# Patient Record
Sex: MALE | Race: BLACK OR AFRICAN AMERICAN | NOT HISPANIC OR LATINO
[De-identification: names, ages, dates, MRNs, and addresses within clinical notes are randomized per-mention and may not be internally consistent; named-entity substitution may affect disease eponyms.]

---

## 2017-10-22 PROBLEM — Z00.00 ENCOUNTER FOR PREVENTIVE HEALTH EXAMINATION: Status: ACTIVE | Noted: 2017-10-22

## 2017-11-21 ENCOUNTER — APPOINTMENT (OUTPATIENT)
Dept: GASTROENTEROLOGY | Facility: CLINIC | Age: 62
End: 2017-11-21

## 2018-01-12 ENCOUNTER — APPOINTMENT (OUTPATIENT)
Dept: GASTROENTEROLOGY | Facility: CLINIC | Age: 63
End: 2018-01-12

## 2018-03-09 ENCOUNTER — APPOINTMENT (OUTPATIENT)
Dept: GASTROENTEROLOGY | Facility: CLINIC | Age: 63
End: 2018-03-09

## 2020-05-18 ENCOUNTER — APPOINTMENT (OUTPATIENT)
Dept: PULMONOLOGY | Facility: CLINIC | Age: 65
End: 2020-05-18

## 2022-02-01 ENCOUNTER — APPOINTMENT (OUTPATIENT)
Dept: GASTROENTEROLOGY | Facility: CLINIC | Age: 67
End: 2022-02-01

## 2022-06-23 ENCOUNTER — APPOINTMENT (OUTPATIENT)
Dept: GASTROENTEROLOGY | Facility: CLINIC | Age: 67
End: 2022-06-23

## 2022-06-23 NOTE — HISTORY OF PRESENT ILLNESS
[de-identified] : 67-year-old gentleman here for colonoscopy.\par \par The patient is here for a colonoscopy. \par Details re: last colonoscopy:\par \par Family history:\par BM/D\par Blood/mucus\par Change in bowel habits\par Weight changes:\par \par Dysphagia:\par Odynophagia:\par GERD sx:\par Smoking:\par NSAID use:\par \par \par

## 2022-09-01 ENCOUNTER — APPOINTMENT (OUTPATIENT)
Dept: GASTROENTEROLOGY | Facility: CLINIC | Age: 67
End: 2022-09-01

## 2022-09-01 VITALS
RESPIRATION RATE: 16 BRPM | OXYGEN SATURATION: 98 % | DIASTOLIC BLOOD PRESSURE: 88 MMHG | HEIGHT: 72 IN | BODY MASS INDEX: 27.5 KG/M2 | TEMPERATURE: 97.6 F | HEART RATE: 82 BPM | SYSTOLIC BLOOD PRESSURE: 150 MMHG | WEIGHT: 203 LBS

## 2022-09-01 DIAGNOSIS — Z98.890 OTHER SPECIFIED POSTPROCEDURAL STATES: ICD-10-CM

## 2022-09-01 DIAGNOSIS — Z78.9 OTHER SPECIFIED HEALTH STATUS: ICD-10-CM

## 2022-09-01 DIAGNOSIS — Z86.39 PERSONAL HISTORY OF OTHER ENDOCRINE, NUTRITIONAL AND METABOLIC DISEASE: ICD-10-CM

## 2022-09-01 DIAGNOSIS — I10 ESSENTIAL (PRIMARY) HYPERTENSION: ICD-10-CM

## 2022-09-01 DIAGNOSIS — J45.909 UNSPECIFIED ASTHMA, UNCOMPLICATED: ICD-10-CM

## 2022-09-01 DIAGNOSIS — E78.00 PURE HYPERCHOLESTEROLEMIA, UNSPECIFIED: ICD-10-CM

## 2022-09-01 PROCEDURE — 99202 OFFICE O/P NEW SF 15 MIN: CPT

## 2022-09-01 RX ORDER — POLYETHYLENE GLYCOL 3350 17 G/17G
17 POWDER, FOR SOLUTION ORAL
Qty: 1 | Refills: 0 | Status: ACTIVE | COMMUNITY
Start: 2022-09-01 | End: 1900-01-01

## 2022-09-01 NOTE — HISTORY OF PRESENT ILLNESS
[de-identified] : 67 m h/o HTN, asthma, L shoulder surgery here for initial colonoscopy.  Patient takes multiple meds at home but he did not bring them and he does not know what they are.  He is also here accompanied by his grandson who helps with the translation.  We also called his daughter to help as well..  He refused \par \par Details re: last colonoscopy:n/a, this is the first\par \par Family history of colon cancer:no\par BM/D:2\par Blood/mucus:no\par Change in bowel habits:no\par Weight changes:stable\par \par Dysphagia:no\par Odynophagia:no\par GERD sx:no\par Smoking:no\par NSAID use:ibuprofen once a week; back pain\par \par

## 2023-03-22 ENCOUNTER — APPOINTMENT (OUTPATIENT)
Dept: GASTROENTEROLOGY | Facility: CLINIC | Age: 68
End: 2023-03-22
Payer: MEDICARE

## 2023-03-22 VITALS
SYSTOLIC BLOOD PRESSURE: 150 MMHG | OXYGEN SATURATION: 96 % | TEMPERATURE: 98.1 F | WEIGHT: 207 LBS | RESPIRATION RATE: 16 BRPM | DIASTOLIC BLOOD PRESSURE: 84 MMHG | HEART RATE: 85 BPM | BODY MASS INDEX: 28.04 KG/M2 | HEIGHT: 72 IN

## 2023-03-22 DIAGNOSIS — Z12.11 ENCOUNTER FOR SCREENING FOR MALIGNANT NEOPLASM OF COLON: ICD-10-CM

## 2023-03-22 PROCEDURE — 99202 OFFICE O/P NEW SF 15 MIN: CPT

## 2023-03-22 PROCEDURE — 99212 OFFICE O/P EST SF 10 MIN: CPT

## 2023-03-22 RX ORDER — POLYETHYLENE GLYCOL 3350 17 G/17G
17 POWDER, FOR SOLUTION ORAL
Qty: 1 | Refills: 0 | Status: ACTIVE | COMMUNITY
Start: 2023-03-22 | End: 1900-01-01

## 2023-03-22 NOTE — ASSESSMENT
[FreeTextEntry1] : 68-year-old gentleman history of asthma, high cholesterol, hypertension, diabetes, shoulder surgery here for colon cancer screening.

## 2023-03-22 NOTE — HISTORY OF PRESENT ILLNESS
[FreeTextEntry1] : 68-year-old gentleman history of asthma (weather dependent, approx  3 times a month, more freq in cold weather as well as allergy season), high cholesterol, hypertension, diabetes, shoulder surgery here for colon cancer screening.  Daughter was reached by phone and was able to translate\par \par \par Details re: last colonoscopy:n/a\par \par Family history of colon cancer:no\par BM/D:1-2\par Blood/mucus:no\par Change in bowel habits:no, regular\par Weight changes:gained\par \par Dysphagia:no\par Odynophagia:no\par GERD sx:no\par Smoking:quit years ago - 20+ y ago\par NSAID use:ASA, \par CBC:PCP within this year\par \par

## 2023-06-01 ENCOUNTER — APPOINTMENT (OUTPATIENT)
Dept: GASTROENTEROLOGY | Facility: GI CENTER | Age: 68
End: 2023-06-01

## 2023-06-01 NOTE — ASSESSMENT
[FreeTextEntry1] : 68-year-old gentleman history of asthma, high cholesterol, hypertension, diabetes, shoulder surgery here for colon cancer screening. \par \par The patient is medically optimized for procedure(s). All questions have been answered. The risks and benefits of the procedure have been discussed and the patient signed consent for the procedure. Preliminary results will be discussed when the patient wakes up from anesthesia, but definitive results will be discussed after the pathology report is issued in 5 business days.\par

## 2023-06-08 RX ORDER — POLYETHYLENE GLYCOL 3350 17 G/17G
17 POWDER, FOR SOLUTION ORAL
Qty: 1 | Refills: 0 | Status: ACTIVE | COMMUNITY
Start: 2023-06-08 | End: 1900-01-01

## 2023-07-25 ENCOUNTER — APPOINTMENT (OUTPATIENT)
Dept: GASTROENTEROLOGY | Facility: GI CENTER | Age: 68
End: 2023-07-25

## 2023-07-25 RX ORDER — LOSARTAN POTASSIUM AND HYDROCHLOROTHIAZIDE 100; 12.5 MG/1; MG/1
100-12.5 TABLET, FILM COATED ORAL
Refills: 0 | Status: ACTIVE | COMMUNITY

## 2023-07-25 RX ORDER — CHOLECALCIFEROL (VITAMIN D3) 25 MCG
TABLET ORAL
Refills: 0 | Status: ACTIVE | COMMUNITY

## 2023-07-25 RX ORDER — SODIUM SULFATE, POTASSIUM SULFATE AND MAGNESIUM SULFATE 1.6; 3.13; 17.5 G/177ML; G/177ML; G/177ML
17.5-3.13-1.6 SOLUTION ORAL
Qty: 2 | Refills: 0 | Status: ACTIVE | COMMUNITY
Start: 2023-07-25 | End: 1900-01-01

## 2023-07-25 RX ORDER — GABAPENTIN 100 MG
100 TABLET ORAL
Refills: 0 | Status: ACTIVE | COMMUNITY

## 2023-07-25 RX ORDER — LORATADINE 10 MG/1
10 TABLET ORAL
Refills: 0 | Status: ACTIVE | COMMUNITY

## 2023-07-25 RX ORDER — ASPIRIN 81 MG
81 TABLET, DELAYED RELEASE (ENTERIC COATED) ORAL
Refills: 0 | Status: ACTIVE | COMMUNITY

## 2023-07-25 NOTE — HISTORY OF PRESENT ILLNESS
[FreeTextEntry1] : 68-year-old gentleman history of asthma (weather dependent, approx 3 times a month, more freq in cold weather as well as allergy season), high cholesterol, hypertension, diabetes, shoulder surgery here for colon cancer screening. Daughter was reached by phone and was able to translate

## 2023-07-25 NOTE — ASSESSMENT
[FreeTextEntry1] : 68-year-old gentleman history of asthma (weather dependent, approx 3 times a month, more freq in cold weather as well as allergy season), high cholesterol, hypertension, diabetes, shoulder surgery here for colon cancer screening. Daughter was reached by phone and was able to translate\par \par The patient is medically optimized for procedure(s). All questions have been answered. The risks and benefits of the procedure have been discussed and the patient signed consent for the procedure. Preliminary results will be discussed when the patient wakes up from anesthesia, but definitive results will be discussed after the pathology report is issued in 5 business days.\par

## 2023-09-20 ENCOUNTER — APPOINTMENT (OUTPATIENT)
Dept: GASTROENTEROLOGY | Facility: GI CENTER | Age: 68
End: 2023-09-20

## 2023-09-20 ENCOUNTER — EMERGENCY (EMERGENCY)
Facility: HOSPITAL | Age: 68
LOS: 1 days | Discharge: HOME | End: 2023-09-20
Attending: EMERGENCY MEDICINE
Payer: MEDICARE

## 2023-09-20 VITALS
TEMPERATURE: 98 F | DIASTOLIC BLOOD PRESSURE: 107 MMHG | HEIGHT: 74 IN | HEART RATE: 63 BPM | SYSTOLIC BLOOD PRESSURE: 199 MMHG | WEIGHT: 240.08 LBS | RESPIRATION RATE: 16 BRPM | OXYGEN SATURATION: 100 %

## 2023-09-20 VITALS — SYSTOLIC BLOOD PRESSURE: 185 MMHG | DIASTOLIC BLOOD PRESSURE: 105 MMHG

## 2023-09-20 PROCEDURE — 99283 EMERGENCY DEPT VISIT LOW MDM: CPT

## 2023-09-20 RX ORDER — LOSARTAN POTASSIUM 100 MG/1
100 TABLET, FILM COATED ORAL ONCE
Refills: 0 | Status: COMPLETED | OUTPATIENT
Start: 2023-09-20 | End: 2023-09-20

## 2023-09-20 RX ORDER — LOSARTAN POTASSIUM 100 MG/1
100 TABLET, FILM COATED ORAL DAILY
Refills: 0 | Status: DISCONTINUED | OUTPATIENT
Start: 2023-09-20 | End: 2023-09-27

## 2023-09-20 RX ORDER — AMLODIPINE BESYLATE 2.5 MG/1
10 TABLET ORAL ONCE
Refills: 0 | Status: COMPLETED | OUTPATIENT
Start: 2023-09-20 | End: 2023-09-20

## 2023-09-20 RX ADMIN — LOSARTAN POTASSIUM 100 MILLIGRAM(S): 100 TABLET, FILM COATED ORAL at 13:28

## 2023-09-20 RX ADMIN — LOSARTAN POTASSIUM 100 MILLIGRAM(S): 100 TABLET, FILM COATED ORAL at 11:37

## 2023-09-20 RX ADMIN — AMLODIPINE BESYLATE 10 MILLIGRAM(S): 2.5 TABLET ORAL at 10:43

## 2023-09-20 NOTE — ED PROVIDER NOTE - PATIENT PORTAL LINK FT
You can access the FollowMyHealth Patient Portal offered by [facility] by registering at the following website: [email or web address]. By joining [employer] [facility]’s FollowMyHealth portal, you will also be able to view your health information using other applications (apps) compatible with our system.

## 2023-09-20 NOTE — ED ADULT NURSE NOTE - NS ED NURSE LEVEL OF CONSCIOUSNESS ORIENTATION
Patient dropped off FMLA paperwork at hs office to be completed and can be reached at [phone] when ready for pickup. Oriented - self; Oriented - place; Oriented - time

## 2023-09-20 NOTE — ED PROVIDER NOTE - OBJECTIVE STATEMENT
68-year-old male with past medical history of hypertension, hyperlipidemia, allergies, diabetes presenting with hypertension.  Patient states that he was going to get a colonoscopy today, had his blood pressure taken which was elevated and was sent to the ED.  Patient denies any headache, visual changes, nausea vomiting, chest pain shortness of breath, back pain, abdominal pain.  He states that he discontinued his medication for the last 3 weeks in preparation of a colonoscopy.  He is unsure what medications he takes. 68-year-old male with past medical history of hypertension, hyperlipidemia, allergies, diabetes presenting with hypertension.  Patient states that he was going to get a colonoscopy today, had his blood pressure taken which was elevated and was sent to the ED.  Patient denies any headache, visual changes, nausea vomiting, chest pain shortness of breath, back pain, abdominal pain.  He states that he discontinued his medication for the last 3 weeks in preparation of a colonoscopy.  He is unsure what medications he takes.    History taken with [facility]  ID# [id number]

## 2023-09-20 NOTE — ED ADULT NURSE NOTE - OBJECTIVE STATEMENT
patient was at an outpatient colonoscopy clinic earlier today and was sent here because his bp was elevated. offers no complaints now.

## 2023-09-20 NOTE — ED PROVIDER NOTE - NSFOLLOWUPINSTRUCTIONS_ED_ALL_ED_FT
1. Follow up with your primary care physician within 2-3days for reevaluation.  2.  Return to the Emergency Department immediately for any worsening, progressive or concerning symptoms.   3. Take all medications as directed.     Ipètansyon, Adilt  Hypertension, Adult  Tansyon wo (ipètansyon) se lè fòs ruff an ponpe nan atè ou yo twò fò. Atè yo se veso sangen ki pran ruff depi nan kè a samm mennen li nan tout kò a. Ipètansyon fòse kè a travay pi di samm l ponpe ruff epi sa ka lakòz atè yo leona etwat oswa rèd. Ipètansyon ki pa trete oswa ki pa gladys kontwòl ka lakòz evens kadyak, ensifizans kadyak, yon estwok, maladi andrew, ak lòt pwoblèm.    Yon lekti tansyon ateryèl gen princess yon chif ki pi gwo gladys tèt yon chif ki pi piti. Depreferans, tansyon ou dwe pi ba ke 120/80. Premye chif la ("anlè") rele presyon sistolik la. Li se yon mezi presyon nan atè ou yo pandan kè w ap bat. Dezyèm chif la ("anba") rele presyon dyastolik la. Li se yon mezi presyon nan atè ou yo pandan kè w detann.    Sophia ki lakòz li?  Nou pa micah davila egzat pwoblèm sa a. Gen sèten pwoblèm ki lakòz oswa tansyon wo.    Sophia ki ogmante risk la?  Sèten faktè ka fè ou gen plis chans samm leona gen tansyon wo. Kèk nan faktè risk sa yo anba kontwòl ou, sa gen princess:  Fimen.  Si ou pa fè ase egzèsis fizik oswa aktivite fizik.  Lè ou gen twòp pwa.  Si ou pran twòp grès, sik, kalori oswa sèl (sodyòm) nan rejim alimantè ou.  Si ou bwè twòp alkòl.  Lòt faktè risk gen princess yo:  Si ou gen yon istwa pèsonèl maladi kè, dyabèt, kolestewòl wo, oswa maladi andrew.  Estrès.  Si gen yon istwa tansyon wo ak kolestewòl wo nan fanmi an.  Si ou gen apne obstriktif somèy.  Laj. Risk la ogmante avèk laj.  Sophia siy oswa sentòm yo ye?  Tansyon wo gendwa pa lakòz okenn sentòm. Tansyon wo anpil (evens ipètansyon) ka lakòz:  Maltèt.  Batman kè rapid oswa iregilye (palpitasyon).  Souf kout.  Nen senyen.  Kèplen ak vomisman.  Chanjman nan vizyon.  Doulè grav nan pwatrin, vètij, ak evens.  Kailey yo fè dyagnostik li?  Samm fè dyagnostik pwoblèm sa a, yo mezire tansyon ou pandan ou iglesia, avèk bra ou ki repoze gladys yon sifas plat, ruff janm ou pa kwaze, epi sunil ou yo plat gladys atè a. Y ap mete bann twal aparèy ki samm mezire tansyon an dirèkteman gladys po anwo nan bra ou nan nivo kè ou. Ou dwe mezire tansyon an omwen de fwa nan menm bra a. Sèten pwoblèm ka lakòz yon diferans nan tansyon ou ant bra dwat ou ak bwa goch ou.    Si ou gen yon lekti tansyon wo pandan yon sèl vizit oswa ou gen tansyon nòmal avèk lòt faktè risk, yo ka mande samm ou:  Retounen nan yon lòt bernard samm verifye tansyon ou ankò.  Siveye tansyon ou lakay ou pandan 1 semèn oswa pi lontan.  Si yo fè dyagnostik ipètansyon samm ou, yo ka fè lòt tès ruff oswa tès imajri samm sami pwofesyonèl swen sante ou konprann risk jeneral ou genyen samm lòt pwoblèm medikal.    Kailey yo trete li?  Yo trete pwoblèm sa a avèk chanjman nan mòdvi ki bon samm sante, tankou manje nouriti ki bon samm sante, fè plis egzèsis, epi diminye gladys konsomasyon alkòl ou. Yo ka refere w samm konsèy gladys yon rejim ki bon samm sante epi aktivite fizik.    Pwofesyonèl swen sante ou ka preskri ou medikaman si chanjman nan mòdvi ou pa ase samm kenbe tansyon ou gladys kontwòl, ak si:  Tansyon sistolik ou pi wo pase 130.  Tansyon dyastolik ou pi wo pase 80.  Tansyon pèsonèl sib ou an ka varye selon pwoblèm medikal ou, laj ou, ak lòt faktè.    Swiv enstriksyon sa yo lakay ou:  Manjc ak bwè    A plate with examples of foods in a healthy diet.  Kasandra yon rejim alimantè ki gen anpil fib ak potasyòm, e ki gen ti kantite sodyòm, ti kantite sik yo ajoute, ak ti kantite grès. Yon egzanp plan alimantasyon sa a rele rejim alimantè DASH. DASH vle di Metòd Alimantè samm [place] Ipètansyon (Dietary Approaches to Stop Hypertension). Samm w kasandra fason sa a:  Manje anpil fwi ak legim andressa. Nan chak repa, eseye ranpli mwatye asyèt ou avèk fwi ak legim.  Manjc grejannet antye, tankou pasta ble antye, diri bren, oswa pen [facility]. Ranpli apeprè yon ka asyèt ou avèk grenn antye.  Kasandra oswa bwè pwodui abaz lèt ki gen yon ti kantite grès, tankou lèt ekreme oswa yogout ki gen yon ti kantite grès.  Evite moso vyann gra, vyann transfòme oswa ke yo bay gou, epi bèt volay ak po. Ranpli apeprè yon ka asyèt ou avèk pwoteyin mèg, tankou pwason, poul ruff po, pwa, ze, oswa tofu.  Evite kasandra pre-fabrike ak pwodui alimantè transfòme. Sa yo gen tandans gen plis kantite sodyòm, sik ak grès yo ajoute.  Diminye kantite sodyòm ou konsome chak bernard. Anpil moun ki gen ipètansyon dwe manje mwens ke 1,500 mg sodyòm pa bernard.  Pa bwè alkòl si:  Pwofesyonèl swen sante w di w samm pa bwè.  Ou ansent, ou ka ansent oswa ou planifye samm w ansent.  Si ou bwè alkòl:  Limite kantite ou pran samm li nan:  0–1 vè pa bernadr samm fi.  0–2 vè pa bernard samm gason.  Konnen ki kantite alkòl ki nan bwason ou. Ozetazini, yon vè egal yon boutèy byè 12 ons (355 mL), yon vè 5 ons diven (148 mL), oswa yon vè 1½ ons likè fò (44 mL).  Mòdvi    A blood pressure monitor and cuff.   Kolabore avèk pwofesyonèl swen sante ou samm kenbe yon pwa kò ki bon samm sante oswa samm w pèdi pwa. Mande sophia ki yon pwa ideyal samm ou.  Fè omwen 30 minit egzèsis ki ka fè kè w bat pi vit (egzèsis ayewobik) pifò bernard nan semèn nan. Aktivite yo ka gen princess mache, naje, oswa monte bisiklèt.  Mete egzèsis samm ranfòse misk ou yo (egzèsis rezistans), tankou Pilates oswa leve pwa, kòm yon jyoti nan woutin egzèsis ou chak semèn. Eseye fè holly de egzèsis sa yo pandan 30 minit omwen 3 bernard pa semèn.  Piga itilize okenn pwodui ki gen nikotin oswa tabak princess. Pwodui sa yo gen princess sigarèt, tabak samm aster, ak aparèy vaping, tankou sigarèt elektwonik yo. Si ou bezwen èd samm w kite fimen, mande pwofesyonèl swen sante w.  Siveye tansyon w lakay ou holly pwofesyonèl swen sante w di ou.  Annel nan tout vizit swivi yo. Sa enpòtan.  Medikaman    Pran [facility] ruff preskripsyon ak [facility] gladys preskripsyon sèlman holly pwofesyonèl swen sante w di ou. Swiv direktiv yo avèk anpil atansyon. Ou dwe pran [facility] samm tansyon yo daprè holly yo preskri.  Piga sote dòz [facility] samm tansyon yo. Si ou fè sa w ap mete tèt ou nan risk samm pwoblèm epi sa ka lakòz [facility] an mwens efikas.  Mande pwofesyonèl swen sante ou enfòmasyon konsènan efè segondè oswa reyaksyon ke ou dwe siveye [facility].  Kontakte yon pwofesyonèl swen sante si ou:  Panse ou gen yon reyaksyon ak yon medikaman w ap pran.  Gen maltèt k ap plede tounen (rekiran).  Tony vètij.  Gen anflamasyon nan cheviy ou yo.  Gen pwoblèm samm wè.  Chèche èd touswit si ou:  Leona gen yon maltèt grav oswa konfizyon.  Gen feblès oswa pèt sansasyon ki etranj.  Tony w ap endispoze.  Gen doulè grav nan pwatrin oswa nan vant ou.  Ap plede vomi.  Gen pwoblèm samm respire.  Sentòm sa yo kapab yon ijans. Chèche èd touswit. Rele 911.  Piga rete tann samm wè si sentòm yo pral disparèt.  Piga kondi samm w mennen tèt ou lopital.  Rezime  Ipètansyon se lè fòs ruff an ponpe nan atè ou yo twò fò. Si pwoblèm sa a pa gladys kontwòl, sa ka mete w nan risk samm konplikasyon ki grav.  Tansyon pèsonèl sib ou an ka varye selon pwoblèm [facility] ou, laj ou, ak lòt faktè. Samm pifò moun, yon tansyon nòmal pi piti pase 120/80.  Chanjman nan mòdvi, patrickkaman, rozwa yon konbinezon toulède kapab trete ipètansyon. Felix dillon gen princess pèdi pwa, kasandra rejim alimantè ki bon samm sante, ki gen yon ti kantite sodyòm, fè plis egzèsis, epi limite alkòl.  Enfòmasyon sa yo pa la samm ranplase konsèy pwofesyonèl swen sante ou ba ou. Sonje samm pale gladys nenpòt kesyon ou genyen avèk pwofesyonèl swen sante ou.

## 2023-09-20 NOTE — ED PROVIDER NOTE - PHYSICAL EXAMINATION
Gen: NAD, AOx3  Head: NCAT  HEENT: oral mucosa moist, normal conjunctiva, oropharynx clear without exudate or erythema  Lung: CTAB, no respiratory distress, no wheezing, rales, rhonchi  CV: normal s1/s2, rrr, no murmurs, Normal perfusion, pulses 2+ throughout  Abd: soft, NTND  MSK: No edema, no visible deformities, full range of motion in all 4 extremities  Neuro: No focal neurologic deficits  Skin: No rash   Psych: normal affect

## 2023-09-20 NOTE — ED PROVIDER NOTE - CLINICAL SUMMARY MEDICAL DECISION MAKING FREE TEXT BOX
Patient presenting with asymptomatic hypertension in setting of missing medication for the last 3 weeks.  I have discussed importance of resuming all medications with patient.  I called [facility] pharmacy to do a medication reconciliation and determined that the patient takes the following medications for blood pressure–losartan hydrochlorothiazide, amlodipine.  Will give dose of losartan and  hydrochlorothiazide and amlodipine, and reassess blood pressure.

## 2025-09-15 ENCOUNTER — APPOINTMENT (OUTPATIENT)
Dept: UROLOGY | Facility: CLINIC | Age: 70
End: 2025-09-15
Payer: MEDICARE

## 2025-09-15 VITALS
SYSTOLIC BLOOD PRESSURE: 136 MMHG | DIASTOLIC BLOOD PRESSURE: 77 MMHG | WEIGHT: 207 LBS | BODY MASS INDEX: 28.04 KG/M2 | HEIGHT: 72 IN

## 2025-09-15 DIAGNOSIS — Z00.00 ENCOUNTER FOR GENERAL ADULT MEDICAL EXAMINATION W/OUT ABNORMAL FINDINGS: ICD-10-CM

## 2025-09-15 DIAGNOSIS — Q64.9 CONGENITAL MALFORMATION OF URINARY SYSTEM, UNSPECIFIED: ICD-10-CM

## 2025-09-15 PROCEDURE — 51798 US URINE CAPACITY MEASURE: CPT

## 2025-09-15 PROCEDURE — 99203 OFFICE O/P NEW LOW 30 MIN: CPT
